# Patient Record
Sex: FEMALE | Race: WHITE
[De-identification: names, ages, dates, MRNs, and addresses within clinical notes are randomized per-mention and may not be internally consistent; named-entity substitution may affect disease eponyms.]

---

## 2017-11-23 ENCOUNTER — HOSPITAL ENCOUNTER (EMERGENCY)
Dept: HOSPITAL 44 - ED | Age: 67
Discharge: HOME | End: 2017-11-23
Payer: MEDICARE

## 2017-11-23 VITALS — DIASTOLIC BLOOD PRESSURE: 66 MMHG | SYSTOLIC BLOOD PRESSURE: 106 MMHG

## 2017-11-23 DIAGNOSIS — J06.9: Primary | ICD-10-CM

## 2017-11-23 LAB
BASOPHILS NFR BLD: 0.3 % (ref 0–1.5)
EGFR (AFRICAN): > 60
EGFR (NON-AFRICAN): > 60
EOSINOPHIL NFR BLD: 1.5 % (ref 0–6.8)
MCH RBC QN AUTO: 29.6 PG (ref 28–34)
MCV RBC AUTO: 90 FL (ref 80–100)
MONOCYTES %: 3.7 % (ref 0–11)
NEUTROPHILS #: 11.4 # K/UL (ref 1.4–7.7)

## 2017-11-23 PROCEDURE — 71020: CPT

## 2017-11-23 PROCEDURE — 99283 EMERGENCY DEPT VISIT LOW MDM: CPT

## 2017-11-23 PROCEDURE — S1016 NON-PVC INTRAVENOUS ADMINIST: HCPCS

## 2017-11-23 PROCEDURE — 85025 COMPLETE CBC W/AUTO DIFF WBC: CPT

## 2017-11-23 PROCEDURE — 80053 COMPREHEN METABOLIC PANEL: CPT

## 2017-11-23 RX ADMIN — IPRATROPIUM BROMIDE AND ALBUTEROL SULFATE ONE ML: .5; 3 SOLUTION RESPIRATORY (INHALATION) at 03:24

## 2017-11-23 RX ADMIN — BUDESONIDE ONE: 0.5 SUSPENSION RESPIRATORY (INHALATION) at 04:00

## 2017-11-23 RX ADMIN — BUDESONIDE SCH MG: 0.5 SUSPENSION RESPIRATORY (INHALATION) at 03:24

## 2017-11-23 RX ADMIN — IPRATROPIUM BROMIDE AND ALBUTEROL SULFATE ONE: .5; 3 SOLUTION RESPIRATORY (INHALATION) at 04:37

## 2017-11-23 RX ADMIN — IPRATROPIUM BROMIDE AND ALBUTEROL SULFATE ONE: .5; 3 SOLUTION RESPIRATORY (INHALATION) at 03:59

## 2017-11-23 RX ADMIN — LEVOFLOXACIN ONE MG: 500 TABLET, FILM COATED ORAL at 04:25

## 2017-11-23 NOTE — ED PHYSICIAN DOCUMENTATION
General Adult





- HISTORIAN


Historian: patient, spouse





- HPI


Stated Complaint: cough that started Monday 


Chief Complaint: Cough/ Upper Respiratory


Onset: days ago (4)


Timing: still present, worse since


Severity: moderate


Further Comments: yes (She states she went on Monday to see her PCP and she was 

tested for strep and flu which were negative = she did yesterday however start 

a prednisone push and antibiotic she has at home due to increased cough)





- ROS


CONST: fever (she is not sure if the sweats she has had is from fever or 

steroid )


CVS/RESP: shortness of breath, cough (with clear production ).  denies: chest 

pain


MS/SKIN/LYMPH: none


NEURO/PSYCH: denies: headache, dizziness





- PAST HX


Past History: other


Other History: other


Surgeries/Procedures: other


Immunizations: referred to PCP


Allergies/Adverse Reactions: 


 Allergies











Allergy/AdvReac Type Severity Reaction Status Date / Time


 


codeine Allergy Intermediate Rash Verified 11/23/17 03:21


 


diphenhydramine HCl Allergy Intermediate Rash Verified 11/23/17 03:21





[From Benadryl]     


 


meperidine HCl [From Demerol] Allergy Intermediate Rash Verified 11/23/17 03:21














Home Medications: 


 Ambulatory Orders











 Medication  Instructions  Recorded


 


Albuterol Sulfate 2.5 mg IH Q4H PRN 04/27/14


 


Albuterol Sulfate [Ventolin HFA 1 puff IH DAILY PRN 04/27/14





Inhaler]  


 


Calcium Carb 500Mg [Tums] 1,000 mg PO D 04/27/14


 


Cholecalciferol [Vitamin D-3] 5,000 unit PO QD 04/27/14


 


Fexofenadine HCl [Allegra] 180 mg PO D 04/27/14


 


Lisinopril 20 mg PO D 04/27/14


 


Montelukast Sodium [Singulair] 10 mg PO HS 04/27/14


 


Nitroglycerin [Nitroquick] 0.4 mg SL Q5M PRN 04/27/14


 


Ondansetron HCl Rapdis [Zofran Odt] 4 mg PO Q8 PRN #20 tab 04/27/14


 


Prednisone 40 mg PO D PRN 04/27/14


 


Simvastatin [Zocor] 20 mg PO HS 04/27/14


 


Sodium Chloride [Ocean] 45 ml NS HS PRN 04/27/14


 


Aspirin EC [Ecotrin] 81 mg PO D 07/30/16


 


LORazepam [Ativan] 1 mg PO BID 07/30/16


 


Amoxicillin [Amoxil] 500 mg PO TID 11/23/17


 


Benzonatate [Tessalon] 100 mg PO TID PRN 11/23/17


 


Folic Acid/Multivit-Min/Lutein 1 each PO DAILY 11/23/17





[Adult Multivitamin Gummies]  


 


Furosemide [Lasix] 20 mg PO DAILY 11/23/17


 


Ipratropium/Albuterol Sulfate 3 ml IH Q8 #30 ampul.neb 11/23/17





[Duoneb]  


 


Levofloxacin [Levaquin] 1,000 mg PO 1T #1 tablet 11/23/17


 


Levofloxacin [Levaquin] 500 mg PO D #5 tablet 11/23/17


 


Meclizine HCl [Antivert] 25 mg PO QDAY PRN 11/23/17


 


[place]-3S/Dha/Epa/Fish Oil [Fish 1 each PO DAILY 11/23/17





Oil 1,200 mg Softgel]  


 


Omeprazole [Prilosec] 40 mg PO QDAY 11/23/17


 


Sotalol HCl [Betapace] 120 mg PO QDAY 11/23/17


 


Spironolactone [Aldactone] 12.5 mg PO DAILY 11/23/17


 


Sucralfate [Carafate] 1 gm PO QDAY 11/23/17


 


Tramadol HCl [Ultram] 50 mg PO QDAY 11/23/17














- SOCIAL HX


Smoking History: non-smoker


Alcohol Use: none


Drug Use: none





- FAMILY HX


Family History: No





- VITAL SIGNS


Vital Signs: 





 Vital Signs











Temp Pulse Resp BP Pulse Ox


 


          142/71    


 


          07/30/16 19:03   














- REVIEWED ASSESSMENTS


Nursing Assessment  Reviewed: Yes


Vitals Reviewed: Yes





Progress





- Progress


Progress: 





post neb she feels "so much better" She is worried about her cough and sleep 


Offered her tessalon pearls and she has those at home - discussed proper use of 

this med


She will discuss with her PCP adding a ICS with her inhaler regimen 





ED Results Lab/Radiology





- Radiology


Radiology Impressions: 





Chest 2 views 





History: Cough 





Findings: The lungs are hyperinflated. A dual lead implantable cardiac 

defibrillator is present. Minimal opacity overlies the heart on the lateral film

, possibly representing lingular atelectasis, scar, or pericardial fat pad. 

There is no pleural effusion. The right lung is clear. Heart size is normal. 





Impression: 





1. Lingular atelectasis, scar, or left pericardial fat pad. 





2. Hyperinflation. 





3. Implantable cardiac defibrillator 


Electronically signed on Nov 23, 2017 3:53:46 AM CST by: 


Jim Starr





General Adult Physical Exam





- PHYSICAL EXAM


GENERAL APPEARANCE: mild distress


EENT: eye inspection normal


NECK: normal inspection


RESPIRATORY: rales, rhonchi, other (mild shortness of breath with 3 or more 

words )


CVS: reg rate & rhythm, heart sounds normal, equal pulses, no murmur


ABDOMEN: soft, no organomegaly, normal bowel sounds


SKIN: warm/dry, normal color


EXTREMITIES: non-tender, normal range of motion


NEURO: oriented X3, CN's nml as tested, motor nml, sensation nml





Discharge


Clincal Impression: 


URI (upper respiratory infection)


Qualifiers:


 URI type: unspecified URI Qualified Code(s): J06.9 - Acute upper respiratory 

infection, unspecified





Clincal Impression: 


 (Ruled Out): URI due to ko influenza virus


Prescriptions: 


Ipratropium/Albuterol Sulfate [Duoneb] 3 ml IH Q8 #30 ampul.neb


Levofloxacin [Levaquin] 500 mg PO D #5 tablet


Levofloxacin [Levaquin] 1,000 mg PO 1T #1 tablet


Referrals: 


Hari Steele [Primary Care Provider] - 2 Days


Condition: Stable


Disposition: 01 HOME, SELF-CARE


Decision to Admit: NO


Date of Decison to Admit: 11/23/17


Decision Time: 04:32

## 2017-11-23 NOTE — DIAGNOSTIC IMAGING REPORT
YAMILEX ORDONEZ 

[facility]

[address] [address]

[place], [place]. [address]

 

 

 

 

Report Submission Date: 2017 3:53:46 AM CST

Patient       Study

Name:   PAVEL ESPINOSA       Date:   2017 3:42:28 AM CST

MRN:   [id number]       Modality Type:   CR

Gender:   F       Description:   CHEST

:   46       Institution:   [facility]

Physician:   YAMILEX ORDONEZ

     Accession:    [id number]

 

 

Chest 2 views 



History: Cough 



Findings: The lungs are hyperinflated. A dual lead implantable cardiac 
defibrillator is present. Minimal opacity overlies the heart on the lateral film
, possibly representing lingular atelectasis, scar, or pericardial fat pad. 
There is no pleural effusion. The right lung is clear. Heart size is normal. 



Impression: 



1. Lingular atelectasis, scar, or left pericardial fat pad. 



2. Hyperinflation. 



3. Implantable cardiac defibrillator

 

Electronically signed on 2017 3:53:46 AM CST by:

Jim LINDSEY

## 2018-03-08 ENCOUNTER — HOSPITAL ENCOUNTER (EMERGENCY)
Dept: HOSPITAL 44 - ED | Age: 68
LOS: 1 days | Discharge: ANOTHER HOSPITAL | End: 2018-03-09
Payer: MEDICARE

## 2018-03-08 DIAGNOSIS — J44.1: ICD-10-CM

## 2018-03-08 DIAGNOSIS — J45.901: Primary | ICD-10-CM

## 2018-03-08 LAB
BASOPHILS NFR BLD: 0.7 % (ref 0–1.5)
EGFR (AFRICAN): > 60
EGFR (NON-AFRICAN): > 60
EOSINOPHIL NFR BLD: 1.1 % (ref 0–6.8)
MCH RBC QN AUTO: 30.4 PG (ref 28–34)
MCV RBC AUTO: 90.6 FL (ref 80–100)
MONOCYTES %: 7.1 % (ref 0–11)
NEUTROPHILS #: 7 # K/UL (ref 1.4–7.7)

## 2018-03-08 PROCEDURE — 80053 COMPREHEN METABOLIC PANEL: CPT

## 2018-03-08 PROCEDURE — S1016 NON-PVC INTRAVENOUS ADMINIST: HCPCS

## 2018-03-08 PROCEDURE — 96375 TX/PRO/DX INJ NEW DRUG ADDON: CPT

## 2018-03-08 PROCEDURE — 71046 X-RAY EXAM CHEST 2 VIEWS: CPT

## 2018-03-08 PROCEDURE — 94640 AIRWAY INHALATION TREATMENT: CPT

## 2018-03-08 PROCEDURE — 83605 ASSAY OF LACTIC ACID: CPT

## 2018-03-08 PROCEDURE — 96365 THER/PROPH/DIAG IV INF INIT: CPT

## 2018-03-08 PROCEDURE — 99284 EMERGENCY DEPT VISIT MOD MDM: CPT

## 2018-03-08 PROCEDURE — 85025 COMPLETE CBC W/AUTO DIFF WBC: CPT

## 2018-03-08 NOTE — ED PHYSICIAN DOCUMENTATION
Dyspnea





- HISTORIAN


Historian: patient





- HPI


Stated Complaint: Shortness of air/Cough


Chief Complaint: Wheezing


Onset: days ago


Duration: continues in ED, worse


Severity: severe


Exacerbated By: change in position, laying flat, coughing


Associated Symptoms: chills, chest discomfort, productive cough


Further Comments: yes (67 year old female patient presents with cough and 

wheezing. Patient states symptoms started on Tuesday.  Was started on Amoxil po

, called Dr Butler yesterday - changed to levaquin and started 40mg prednisone 

qd x 5 days.  Patient reports symptoms are much worse.  Has only used 2 nebs 

today.  Concerned she will overdose.  Does not use inhaled steroid - "they all 

give me thrush".  C/O ipratropium "make me nervous".)





- ROS


CONST: recent illness


EYES/ENT: none


GI/: none


NEURO/PSYCH: denies: headache


MS/SKIN/LYMPH: none





- PAST HX


Lung Disease: asthma, COPD


Cardiac Disease: other (PPM)


PE Risk Factors: hypertension


Surgeries/Procedures: other (ABLATION, PPM)


Immunizations: influenza, pneumovax


Allergies/Adverse Reactions: 


 Allergies











Allergy/AdvReac Type Severity Reaction Status Date / Time


 


codeine Allergy Intermediate Rash Verified 03/08/18 22:49


 


diphenhydramine HCl Allergy Intermediate Rash Verified 03/08/18 22:49





[From Benadryl]     


 


meperidine HCl [From Demerol] Allergy Intermediate Rash Verified 03/08/18 22:49














Home Medications: 


 Ambulatory Orders











 Medication  Instructions  Recorded


 


Albuterol Sulfate 2.5 mg IH Q4H PRN 04/27/14


 


Albuterol Sulfate [Ventolin HFA 1 puff IH DAILY PRN 04/27/14





Inhaler]  


 


Calcium Carb 500Mg [Tums] 1,000 mg PO D 04/27/14


 


Cholecalciferol [Vitamin D-3] 5,000 unit PO QD 04/27/14


 


Fexofenadine HCl [Allegra] 180 mg PO D 04/27/14


 


Lisinopril 20 mg PO D 04/27/14


 


Montelukast Sodium [Singulair] 10 mg PO HS 04/27/14


 


Nitroglycerin [Nitroquick] 0.4 mg SL Q5M PRN 04/27/14


 


Ondansetron HCl Rapdis [Zofran Odt] 4 mg PO Q8 PRN #20 tab 04/27/14


 


Prednisone 40 mg PO D PRN 04/27/14


 


Simvastatin [Zocor] 20 mg PO HS 04/27/14


 


Sodium Chloride [Ocean] 45 ml NS HS PRN 04/27/14


 


Aspirin EC [Ecotrin] 81 mg PO D 07/30/16


 


LORazepam [Ativan] 1 mg PO BID 07/30/16


 


Benzonatate [Tessalon] 100 mg PO TID PRN 11/23/17


 


Folic Acid/Multivit-Min/Lutein 1 each PO DAILY 11/23/17





[Adult Multivitamin Gummies]  


 


Furosemide [Lasix] 20 mg PO DAILY 11/23/17


 


Ipratropium/Albuterol Sulfate 3 ml IH Q8 #30 ampul.neb 11/23/17





[Duoneb]  


 


Levofloxacin [Levaquin] 500 mg PO D #5 tablet 11/23/17


 


Meclizine HCl [Antivert] 25 mg PO QDAY PRN 11/23/17


 


[place]-3S/Dha/Epa/Fish Oil [Fish 1 each PO DAILY 11/23/17





Oil 1,200 mg Softgel]  


 


Omeprazole [Prilosec] 40 mg PO QDAY 11/23/17


 


Sotalol HCl [Betapace] 120 mg PO QDAY 11/23/17


 


Spironolactone [Aldactone] 12.5 mg PO DAILY 11/23/17


 


Sucralfate [Carafate] 1 gm PO QDAY 11/23/17


 


Tramadol HCl [Ultram] 50 mg PO QDAY 11/23/17














- SOCIAL HX


Smoking History: non-smoker





- FAMILY HX


Family History: none





- VITAL SIGNS


Vital Signs: 





 Vital Signs











Temp Pulse Resp BP Pulse Ox


 


          106/66    


 


          11/23/17 04:40   














- REVIEWED ASSESSMENTS


Nursing Assessment  Reviewed: Yes


Vitals Reviewed: Yes





Progress





- Progress


Progress: 





Patient tolerated duoneb with no adverse reaction; states "I feel calmer"





2330 review lab and xray results with patient; would prefer to transfer where 

her heart doctor is.  Patient does not currently have a pulmonologist; would 

benefit from consult. 





2343 Call to [facility] - case discussed with Quang COE





ED Results Lab/Radiology





- Lab Results


Lab Results: 





 Lab Results











  03/08/18 03/08/18





  22:25 22:25


 


WBC    9.40 K/ul K/ul





    (4.00-12.00) 


 


RBC    4.91 M/ul M/ul





    (3.90-5.20) 


 


Hgb    14.9 g/dL g/dL





    (12.0-16.0) 


 


Hct    44.5 % %





    (34.5-46.5) 


 


MCV    90.6 fl fl





    (80.0-100.0) 


 


MCH    30.4 pg pg





    (28.0-34.0) 


 


MCHC    33.5 g/dL g/dL





    (30.0-36.0) 


 


RDW    13.0 % %





    (11.3-14.3) 


 


Plt Count    178 K/mm3 K/mm3





    (130-400) 


 


Neut % (Auto)    74.7 % %





    (39.0-79.0) 


 


Lymph % (Auto)    15.3 % L %





    (16.0-50.0) 


 


Mono % (Auto)    7.1 % %





    (0.0-11.0) 


 


Eos % (Auto)    1.1 % %





    (0.0-6.8) 


 


Baso % (Auto)    0.7 





    (0.0-1.5) 


 


Neut # (Auto)    7.0 # k/uL # k/uL





    (1.4-7.7) 


 


Lymph # (Auto)    1.4 # k/uL # k/uL





    (0.6-4.0) 


 


Mono # (Auto)    0.7 # k/uL # k/uL





    (0.0-0.9) 


 


Eos # (Auto)    0.1 # k/uL # k/uL





    (0.0-0.6) 


 


Baso # (Auto)    0.1 # k/uL # k/uL





    (0.0-0.5) 


 


Reactive Lymphs %    1.0 % %





    (0.0-5.0) 


 


Reactive Lymphs #    0.1 # k/uL # k/uL





    (0.0-0.8) 


 


Sodium  139 mmol/L mmol/L  





   (136-145)  


 


Potassium  4.3 mmol/L mmol/L  





   (3.5-5.1)  


 


Chloride  104 mmol/L mmol/L  





   ()  


 


Carbon Dioxide  25 mmol/L mmol/L  





   (22-30)  


 


BUN  21 mg/dL H mg/dL  





   (7-17)  


 


Creatinine  1.00 mg/dL mg/dL  





   (0.52-1.04)  


 


Estimated Creat Clear  111   





   


 


Est GFR ( Amer)  > 60   





  (60 - ) 


 


Est GFR (Non-Af Amer)  > 60   





  (60 - ) 


 


Glucose  109 mg/dL H mg/dL  





   ()  


 


Lactate  2.1 U/L U/L  





   (0.7-2.1)  


 


Calcium  9.1 mg/dL mg/dL  





   (8.4-10.2)  


 


Total Bilirubin  0.5 mg/dL mg/dL  





   (0.2-1.3)  


 


AST  40 U/L U/L  





   (15-46)  


 


ALT  43 U/L U/L  





   (13-69)  


 


Alkaline Phosphatase  74 U/L U/L  





   ()  


 


Total Protein  6.7 g/dL g/dL  





   (6.3-8.2)  


 


Albumin  3.9 g/dL g/dL  





   (3.5-5.0)  














- Radiology


Radiology Impressions: 





 


PA and lateral chest


Clinical history: COUGH SINCE 3/5/2018 WITH HX OF CHRONIC BRONCHITIS; PNEUMONIA

; ASTHMA FOR 20 YEARS


Findings: Examination of the chest in PA and lateral views with no prior films 

for comparison demonstrates atelectasis or fibrosis in the right middle lobe. 

Lungs are otherwise clear. Cardiac silhouette is within normal limits and the 

aorta is atherosclerotic. Implanted defibrillator overlies left hemithorax.


Impression:


1. Linear atelectasis or fibrosis right midlung zone.


2. Aortic atherosclerosis.


 


Electronically signed on Mar 8, 2018 11:35:50 PM CST by:


Maximino Nevarez





- Orders


Orders: 





 ED Orders











 Category Date Time Status


 


 Place IV Lock 1T Care  03/08/18 22:22 Active


 


 CHEST 2VIEW [RAD] Stat Exams  03/08/18 22:23 Ordered


 


 CBC/PLATELET/DIFF Stat Lab  03/08/18 22:25 Completed


 


 CMP Stat Lab  03/08/18 22:25 Completed


 


 LACTATE Stat Lab  03/08/18 22:25 Completed


 


 Benzonatate [Tessalon] Med  03/08/18 22:35 Discontinued





 200 mg PO NOW ONE   


 


 Ipratropium/Albuterol Sulfate [Duoneb] Med  03/08/18 22:34 Discontinued





 3 ml NEB STAT STA   


 


 methylPREDNISolone SOD SUCC [Solu-MEDROL] Med  03/08/18 22:35 Discontinued





 62.5 mg IVP NOW ONE   














Dyspnea Physical Exam





- EXAM


General Appearance: moderate distress


EENT: eye inspection normal, ENT inspection normal, pharynx normal, no signs of 

dehydration, JAYESH, no nystagmus, TM's nml


Respiratory: respiratory distress (RR 30-32 on arrival, RA sat 90-91%), 

decreased air movement (bases), wheezes (inspiratory and expiratory)


CVS: reg. rate & rhythm, no murmur, no gallop, no friction rub, pulses full, 

pulses equal


Abdomen: non-tender, no organomegaly, no distention, no ascites


Skin: color nml, no rash, warm, nml palp., dry


Extremities: non-tender, normal range of motion, no evidence of injury, no edema

, J, NP


Neuro/Psych: oriented x3, CN's nml as tested, motor nml, sensation nml, mood/

affect nml





Discharge


Clincal Impression: 


Asthma exacerbation


Qualifiers:


 Asthma severity: severe Asthma persistence: unspecified Qualified Code(s): 

J45.901 - Unspecified asthma with (acute) exacerbation





Condition: Fair


Disposition: 02 XFER [facility]


Decision to Admit: NO


Decision Time: 00:15

## 2018-03-09 VITALS — SYSTOLIC BLOOD PRESSURE: 161 MMHG | DIASTOLIC BLOOD PRESSURE: 83 MMHG

## 2018-03-09 NOTE — DIAGNOSTIC IMAGING REPORT
[facility]

[address] [address] [address]

 

 

 

 

Report Submission Date: Mar 8, 2018 11:35:50 PM CST

Patient       Study

Name:   PAVEL LEPE       Date:   Mar 8, 2018 11:00:36 PM CST

MRN:   [id number]       Modality Type:   DX

Gender:   F       Description:   CHEST

:   46       Institution:   [facility]

Physician:   LYN BROWNING (NP) - ER

     Accession:    [id number]

 

 

PA and lateral chest 

Clinical history: COUGH SINCE 3/5/2018 WITH HX OF CHRONIC BRONCHITIS; PNEUMONIA
; ASTHMA FOR 20 YEARS 

Findings: Examination of the chest in PA and lateral views with no prior films 
for comparison demonstrates atelectasis or fibrosis in the right middle lobe. 
Lungs are otherwise clear. Cardiac silhouette is within normal limits and the 
aorta is atherosclerotic. Implanted defibrillator overlies left hemithorax. 

Impression: 

1. Linear atelectasis or fibrosis right midlung zone. 

2. Aortic atherosclerosis.

 

Electronically signed on Mar 8, 2018 11:35:50 PM CST by:

Maximino LINDSEY